# Patient Record
Sex: FEMALE | Race: WHITE | NOT HISPANIC OR LATINO | Employment: STUDENT | URBAN - METROPOLITAN AREA
[De-identification: names, ages, dates, MRNs, and addresses within clinical notes are randomized per-mention and may not be internally consistent; named-entity substitution may affect disease eponyms.]

---
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
## 2018-06-13 ENCOUNTER — RECORDS - HEALTHEAST (OUTPATIENT)
Dept: LAB | Facility: CLINIC | Age: 18
End: 2018-06-13

## 2018-06-14 LAB — C TRACH DNA SPEC QL PROBE+SIG AMP: NEGATIVE

## 2018-10-18 ENCOUNTER — TELEPHONE (OUTPATIENT)
Dept: BEHAVIORAL HEALTH | Facility: CLINIC | Age: 18
End: 2018-10-18

## 2018-10-18 ENCOUNTER — HOSPITAL ENCOUNTER (OUTPATIENT)
Dept: BEHAVIORAL HEALTH | Facility: CLINIC | Age: 18
Discharge: HOME | End: 2018-10-18
Attending: SOCIAL WORKER | Admitting: SOCIAL WORKER
Payer: COMMERCIAL

## 2018-10-18 VITALS
HEART RATE: 98 BPM | WEIGHT: 115 LBS | BODY MASS INDEX: 19.63 KG/M2 | SYSTOLIC BLOOD PRESSURE: 109 MMHG | HEIGHT: 64 IN | DIASTOLIC BLOOD PRESSURE: 88 MMHG

## 2018-10-18 PROCEDURE — H0001 ALCOHOL AND/OR DRUG ASSESS: HCPCS

## 2018-10-18 ASSESSMENT — ANXIETY QUESTIONNAIRES
4. TROUBLE RELAXING: NEARLY EVERY DAY
2. NOT BEING ABLE TO STOP OR CONTROL WORRYING: NEARLY EVERY DAY
1. FEELING NERVOUS, ANXIOUS, OR ON EDGE: NEARLY EVERY DAY
6. BECOMING EASILY ANNOYED OR IRRITABLE: NEARLY EVERY DAY
7. FEELING AFRAID AS IF SOMETHING AWFUL MIGHT HAPPEN: SEVERAL DAYS
GAD7 TOTAL SCORE: 19
5. BEING SO RESTLESS THAT IT IS HARD TO SIT STILL: NEARLY EVERY DAY
3. WORRYING TOO MUCH ABOUT DIFFERENT THINGS: NEARLY EVERY DAY

## 2018-10-18 ASSESSMENT — PAIN SCALES - GENERAL: PAINLEVEL: MODERATE PAIN (4)

## 2018-10-18 NOTE — PROGRESS NOTES
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 skills. Patient is at a moderate to high risk for relapse/continued use. She has untreated co-occurring mental health and substance use issues, which places her at higher risk for continued use.        DIMENSION VI - Recovery Environment   1. Are you employed/attending school? Tell me about that.     Patient was attending [employer], but she is taking the rest of the semester off. She works at [employer] 2 times per week. Patient reported that use has negatively impacted her work and school because she doesn't go to work or school. She has lost jobs due to drug use. Hobbies: horse, dogs, read, arts, crafts, hang with sister.         2A. Describe a typical day; evening for you. Work, school, social, leisure, volunteer, spiritual practices. Include time spent obtaining, using, recovering from drugs or alcohol. (DSM)     - Using: Wake up 2pm to 5pm, get ready, leave for friends to get stuff, use for the night, 5am bed time.  Patient reported that use has prevented her from completing daily tasks.  - Not Using - has hard time sleeping so goes to bed at 3am, now wakes up at Noon or 2pm, no schedule.     2B. How often do you spend more time than you planned using or use more than you planned? (DSM)     She didn't know when to stop many times.     3. How  important is using to your social connections? Do many of your family or friends use?     Most friends use. She will have to get new friends who don't use.     4A. Are you currently in a significant relationship?     Ended her relationship in 01/2018, but they kept talking and using together until 03/2018.     4C. Sexual Orientation:     Heterosexual    5A. Who do you live with?      She lives with her mom and sister.     5B. Tell me about their alcohol/drug use and mental health issues.     Her mother drinks most days. Her sister has been sober for about a year. Her sister emotionally stable now that she is taking medications and is sober. Her mother not emotionally stable.     5C. Are you concerned for your safety there? No    5D. Are you concerned about the safety of anyone else who lives with you? No    6A. Do you have children who live with you?     No    6B. Do you have children who do not live with you?     No    7A. Who supports you in making changes in your alcohol or drug use? What are they willing to do to support you? Who is upset or angry about you making changes in your alcohol or drug use? How big a problem is this for you?      Family.     7B. This table is provided to record information about the person s relationships and available support It is not necessary to ask each item; only to get a comprehensive picture of their support system.  How often can you count on the following people when you need someone?   Partner / Spouse N/A   Parent(s)/Aunt(s)/Uncle(s)/Grandparents Usually supportive   Sibling(s)/Cousin(s) Always supportive   Child(haim) N/A   Other relative(s) N/A   Friend(s)/neighbor(s) Usually supportive   Child(haim) s father(s)/mother(s) N/A   Support group member(s) N/A   Community of jennifer members Usually supportive   /counselor/therapist/healer N/A   Other (specify) NA     8A. What is your current living situation?     She lives with her mom and sister.     8B. What is your  long term plan for where you will be living?     She wants to move out but can't financially afford it    8C. Tell me about your living environment/neighborhood? Ask enough follow up questions to determine safety, criminal activity, availability of alcohol and drugs, supportive or antagonistic to the person making changes.      Patient reported her neighborhood is safe. She reported it's easy to get drugs because she has to drive to get them.    9. Criminal justice history: Gather current/recent history and any significant history related to substance use--Arrests? Convictions? Circumstances? Alcohol or drug involvement? Sentences? Still on probation or parole? Expectations of the court? Current court order? Any sex offenses - lifetime? What level? (DSM)    - Broke into a school - in 7th or 8th grade.   - Patient denied current legal involvement.    10. What obstacles exist to participating in treatment? (Time off work, childcare, funding, transportation, pending retirement time, living situation)     NA    Dimension VI Ratings   Recovery environment - The placing authority must use the criteria in Dimension VI to determine a client s recovery environment.   RISK DESCRIPTIONS - Severity rating: 3 Client is not engaged in structured, meaningful activity and the client s peers, family, significant other, and living environment are unsupportive, or there is significant criminal justice system involvement.    REASONS SEVERITY WAS ASSIGNED - (What support does the person have for making changes? What structure/stability does the person have in his or her daily life that will increase the likelihood that changes can be sustained? What problems exist in the person s environment that will jeopardize getting/staying clean and sober?)     Patient lives with her mother and sister. Her current living situation is somewhat supportive toward recovery, but patient's mother drinks daily. Patient denied being in a significant  relationship. She reported that most of her use of marijuana has been done alone, and her use of other drugs was done socially. Patient lacks a current sober support network. She denied having any concerns regarding immediate living environment or neighborhood. Patient is employed part-time, but hasn't worked much. She recently stopped school. She lacks a daily structure and meaningful activities. Patient denied legal involvement.      Client Choice/Exceptions   Would you like services specific to language, age, gender, culture, Adventism preference, race, ethnicity, sexual orientation or disability?  No    What particular treatment choices and options would you like to have? She wants outpatient, but is willing to do inpatient if recommended.     Do you have a preference for a particular treatment program? [facility].     Criteria for Diagnosis     Criteria for Diagnosis  DSM-5 Criteria for Substance Use Disorder  Instructions: Determine whether the client currently meets the criteria for Substance Use Disorder using the diagnostic criteria in the DSM-V pp.481-58. 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  If court related records were reviewed, summarize here: NA    Rule 25 Assessment Summary and Plan   's  Recommendation    It is recommended that patient:  1). Participate in and complete the [facility] co-occurring intensive outpatient substance use treatment in [place]. It was discussed that if patient is unable to maintain abstinence, or outpatient treatment does not meet her therapeutic needs, then it is recommended she start a higher level of care.   2). Follow all subsequent recommendations of the substance use treatment providers.   3). Abstain from alcohol and all mood-altering substances, except as prescribed. Take all medications as prescribed.   4). Attend AA or NA at least twice weekly and obtain a female sponsor for additional sober supports. Consider attending Al-Anon to address effects of alcohol from family of origin.  5). Become involved in a daily sober recreational activity/hobby of her own interest.  6). Have a mental health evaluation to determine accurate diagnoses and which psychotropic medications would be effective.   7). Begin weekly individual mental health therapy. Work with primary counselor to address suicidal ideation and complete a Patient Safety Plan.     Collateral Contacts     Name:  Jacqui Olmstead Relationship:  Mother Phone Number:  [phone] Releases:  Yes     On 10/18/2018, Therapist called and left voicemail for Ms. Olmstead.     Collateral Contacts     Name:  Latasha Olmstead Relationship:  Sister Phone Number:  [phone] Releases:  Yes     On 10/18/2018, Therapist spoke with Ms. Olmstead. She reported patient used alcohol, marijuana, and Xanax heavily and probably daily for the past two months. Patient had told Ms. Olmstead that she can't stop. It was scary to see patient go to the hospital and Ms. Olmstead hopes that is the last time patient used. Ms. Olmstead thinks patient needs at least Intensive Outpatient Treatment. Ms. Olmstead took and plans to continue to take patient to AA with her.     Collateral Contacts     Name:  [facility] Relationship:     Phone Number:    "Releases:           08/08/2017 - [facility] - Jacqueline Olmstead is a 16 year old female with a history of depression, and polysubstance abuse who presents for concern for withdrawal. Patient admits to multiple substance use including Xanax, Percocet, MDM A, cocaine, and pockets.  She tells me last night she uses Xanax and Percocet and MDMA.  She has used cocaine in the last 24 hours.  She arrives reporting stating \"she feels like shift\".  She tells me she has been to treatment in the past last time was in 2015 at [facility] she was treated for depression and anxiety.  She is supposed to be on escitalopram but does not take this routinely. She reports feeling like \"shit\" but when asked what specific symptoms she has she does not state any symptoms.    11/20/2013 - H&P - PT admitted to  with chief complaint of: \"I don't know, I've been really stressed out.\"  History is obtained from the patient and electronic health record  This patient is a 13 year old  female with a significant past psychiatric history of  depression and anxiety admitted to the hospital on 11/19/13 for the treatment of worsenign symptoms of SIB and social anxiety, now with SI as well. Also describes distorted body image and maladaptive eating behaviors (restricting, excess exercise). She states she is currently engaged in many conflicts both within her family, school, and friends. Family conflict is that she has minimal positive contact with her current family members and feels she gets into many fights with her sisters. At school she notes significant social anxiety and feels limits her ability to pursue social opportunities, which is worsened by the fact that she recently returned to [employer] school. She describes depressive symptoms including difficulty with sleep onset and night time awakening, anhedonia, decreased energy, poor concentration (although this is difficult to separate from her " "persistent anxiety), and suicidal ideation. Her suicide plan at present is to overdose, but this is not progressed to the point of obtaining a supplies for this. She also has a history of self injurious behavior. This began approximately one year ago, stopped after about four months, and resumed two months ago with the start of the new school year. In terms of treatments, she has received about two to three months of therapy, and has been taking citalopram for approximately 4 weeks. She is uncertain as to whether citalopram has had a positive effect at present.     With regard to anxiety, she states her primary anxieties are in social situations in which she worries a great deal about what people think of her, and this leads her to decrease her pursuit of age-appropriate social activities. She states that when in social situations she will sometimes experience \"anxiety attacks\", which consists of her feeling dizzy, racing heart, and on several occasions feels nauseous and has dry heaves.      She also describes a significantly distorted body image. She feels she is quite overweight. Although she does not describe an ideal body weight, she states she tries to eat around 500 mariano per day. She also states that she exercises as much two hours per day, including significant amount time spent running on a treadmill.    Criteria for Diagnosis     A problematic pattern of alcohol/drug use leading to clinically significant impairment or distress, as manifested by at least two of the following, occurring within a 12-month period: 11/11    Alcohol/drug is often taken in larger amounts or over a longer period than was intended.  There is a persistent desire or unsuccessful efforts to cut down or control alcohol/drug use  A great deal of time is spent in activities necessary to obtain alcohol, use alcohol, or recover from its effects.  Craving, or a strong desire or urge to use alcohol/drug  Recurrent alcohol/drug use resulting " in a failure to fulfill major role obligations at work, school or home.  Continued alcohol use despite having persistent or recurrent social or interpersonal problems caused or exacerbated by the effects of alcohol/drug.  Important social, occupational, or recreational activities are given up or reduced because of alcohol/drug use.  Recurrent alcohol/drug use in situations in which it is physically hazardous.  Alcohol/drug use is continued despite knowledge of having a persistent or recurrent physical or psychological problem that is likely to have been caused or exacerbated by alcohol.  Tolerance, as defined by either of the following: A need for markedly increased amounts of alcohol/drug to achieve intoxication or desired effect.  Withdrawal, as manifested by either of the following: The characteristic withdrawal syndrome for alcohol/drug (refer to Criteria A and B of the criteria set for alcohol/drug withdrawal).    Specify if: In early remission:  After full criteria for alcohol/drug use disorder were previously met, none of the criteria for alcohol/drug use disorder have been met for at least 3 months but for less than 12 months (with the exception that Criterion A4,  Craving or a strong desire or urge to use alcohol/drug  may be met).     In sustained remission:   After full criteria for alcohol use disorder were previously met, none of the criteria for alcohol/drug use disorder have been met at any time during a period of 12 months or longer (with the exception that Criterion A4,  Craving or strong desire or urge to use alcohol/drug  may be met).   Specify if:   This additional specifier is used if the individual is in an environment where access to alcohol is restricted.    Mild: Presence of 2-3 symptoms  Moderate: Presence of 4-5 symptoms  Severe: Presence of 6 or more symptoms
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
## 2018-10-18 NOTE — TELEPHONE ENCOUNTER
"SBAR  Name:   Jacqueline Olmstead YOB: 2000 Age:  17 year old Gender:  female   Insurance:   [facility]   Precipitating Event:   Treatment due to own awareness of need for help and Treatment due to pressure from family members to get help   DOC:   Alcohol, Marijuana and Benzodiazepines   Additional abused substances:   Cocaine/Crack, Meth/Amphetamines and Opiates   Medical:   No chronic health problems   Mental Health:   Depression and Anxiety   Previous Detox admissions & CD treatments:  No prior IP detoxification admission(s).  2 prior CD treatment(s).   Psychosocial:  Single, in no serious relationship  No children  Unhealthy and/or using living environment  Minimal support network, Tendency to isolate from others, Relationship conflict with family members or friends due to substance abuse, No history of legal charges, At risk of losing job and Decreased performance at work or school in part due to substance abuse   [place] Suicide Risk Status:    Past month:3. - Moderate Risk: Document in [employer] to counselor, Collaborate with patient / client to develop \"Patient Safety Plan\", Address in Treatment Plan, Continuous monitoring, assessment and intervention and Address in Discharge / Transition Plan  Past 24 hours:1. - Low Risk: Evaluation Counselors:  Document in Epic / SBAR to counselor \"Low Risk\".      Treatment Counselors:  Reassess upon admission as applicable, assess weekly in progress notes under Dimension 3 and summarize in Discharge / Treatment summary under Dimension 3.     Additional Info as needed: **Per Medical Record: Patient endorsed suicidal ideation when she was hospitalized at [facility] on 10/13/2018 when she took Xanax and alcohol. She reports that the Xanax helps her with the anxiety and she denies that she was attempting to harm herself. She has stated, however, that she \"does not care if I die.\"      "

## 2018-10-18 NOTE — PROGRESS NOTES
"ASSESSMENT SUMMARY    PATIENT NAME: Jacqueline Olmstead  MEDICAL RECORD NUMBER: [id number]  PATIENT ADDRESS: [address] [place] [id number]  HOME TELEPHONE NUMBER: [phone] (home)   STATISTICS: YOB: 2000     Age: 17 year old     Gender: female    RELATIONSHIP STATUS:  Single, in no serious relationship    DATE OF ASSESSMENT: 10/18/2018  EVALUATION COUNSELOR: Alicia Caldwell    REFERRAL SOURCE: Self    REASON FOR EVALUATION:     On 10/18/2018, Ms. Olmstead presented to the office of [facility] for a Rule 25 evaluation of substance use. She reported she has recently had trouble staying sober. She doesn't want to live life like that any more. **Per medical record from [facility] on 10/14/2018: Brief history: Jacqueline Olmstead is a 17 y.o. female, history of bipolar, depression, anxiety and substance abuse, who presents with mother and sister for evaluation of ingestion. Patient reports she is not happy with how her life (school, family and work) is going and that she is stressed. She states that she has been abusing xanax for the past couple of months. She reports that she uses xanax bars 2-5 times daily and each bar is 2g. Today she took ~6 bars of xanax and admits to drinking. She reports that the xanax helps her with the anxiety and she denies that she was attempting to harm herself. She has stated, however, that she \"does not care if I die.\" Per patient's family, patient was at a party and called her mother to pick her up as she did not feel safe. Patient was barely able to speak on the phone secondary to alcohol and xanax ingestion. Her mother states that she did not know that the patient was on xanax until a few days ago.    HEALTH HISTORY AND MEDICATIONS:     Patient denied having any chronic biomedical conditions that would interfere with treatment. Patient is prescribed birth control and acne medication. She doesn't take her depression and anxiety medications. " "She reported having pain in her stomach, with a pain level of a 4 from 0-10. Patient has a primary care clinic and is able to seek services as needed and she would benefit from following all of the recommendations of medical providers.    HISTORY OF PREVIOUS TREATMENT AND COUNSELIN and  - She did adolescent inpatient and outpatient substance use treatment at [facility].     HISTORY OF ALCOHOL AND DRUG USE:     - Alcohol - Since end of 2018 to Current - drinking daily or every other day, consuming beer, vodka, or whiskey \"to the point where I get drunk.\" LAST USE: 10/13/2018 - she was hospitalized for using alcohol and Xanax   - Marijuana - Since 9th grade - almost daily use. LAST USE: 10/14/2018. Dabs - Since age 15 - almost daily smoking 1/2g. LAST USE:2 weeks ago, 1/2g  - Xanax -  Since end of 2018 until 10/13/18 - daily used 1/2bar to 7 or 8 full bars (each bar is 2mg).  She gets Xanax from others. She has tried other benzos, but prefers Xanax.  LAST USE: 10/13/18, 6 bars  - Cocaine - Pattern of Use - snorting 1 bump up to 1g about two to three times per month. LAST USE: 10/06/18, 1/4g  - Adderall - 9th grade - used daily for a week. Current Use - once a month, normally taking 1 to 5 pills. LAST USE: 2018, 1 pill  - Oxy, Percocet, Vicodin - Age 14 until Current - takes various pills two to three times per month. LAST USE: End of 2018    SUMMARY OF SUBSTANCE USE DISORDER SYMPTOMS ACKNOWLEDGED BY THE PATIENT: The patient identified positively with 11 of the 11 DSM-5 criteria for a primary diagnostic impression of substance use disorder severe.     SUMMARY OF COLLATERAL DATA:    - On 10/18/2018, Therapist spoke with Ms. Olmstead. She reported patient used alcohol, marijuana, and Xanax heavily and probably daily for the past two months. Patient had told Ms. Olmstead that she can't stop. It was scary to see patient go to the hospital and Ms. Olmstead hopes that is the last time patient used. MsSerafin Olmstead " "thinks patient needs at least Intensive Outpatient Treatment. Ms. Olmstead took and plans to continue to take patient to  with her.   - 08/08/2017 - [facility] Emergency Department - Jacqueline Olmstead is a 16 year old female with a history of depression, and polysubstance abuse who presents for concern for withdrawal. Patient admits to multiple substance use including Xanax, Percocet, MDM A, cocaine, and pockets.  She tells me last night she uses Xanax and Percocet and MDMA.  She has used cocaine in the last 24 hours.  She arrives reporting stating \"she feels like shift\".  She tells me she has been to treatment in the past last time was in 2015 at [facility] she was treated for depression and anxiety.  She is supposed to be on escitalopram but does not take this routinely. She reports feeling like \"shit\" but when asked what specific symptoms she has she does not state any symptoms.      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    Patient does not appear at risk of having significant withdrawal symptoms at this time. She denied current feelings of withdrawal. Patient reports that her last use of alcohol and Xanax was on 10/13/2018 and her last use of marijuana was 10/14/2018. She was hospitalized on 10/13/2018 and medically monitored. Patient was administered a breathalyzer during the evaluation and the SANKET was 0.00. Patient was also administered an urinalysis during the evaluation and the UA was positive for THC and " benzodiazepine. If she resumes drinking and using, patient may need detox before treatment. At the time of the Substance Use Evaluation her blood pressure was 109/88 and pulse was 98 BPM.     DIMENSION 2: Biomedical Conditions:  The patient scored a 1.    Patient denied having any chronic biomedical conditions that would interfere with treatment. Patient is prescribed birth control and acne medication. She doesn't take her depression and anxiety medications. She reported having pain in her stomach, with a pain level of a 4 from 0-10. Patient has a primary care clinic and is able to seek services as needed and she would benefit from following all of the recommendations of medical providers.    DIMENSION 3: Emotional and Behavioral:  The patient scored a 2.    Patient reported diagnoses of depression and social anxiety. Patient s PHQ-9 score was 23 out of 27, indicating severe depression. Patient s SUMANTH-7 score was 19 out of 21, indicating severe anxiety. Patient denied suicidal and self-injurious ideation and intent at this time. Patient reported one suicide attempt in the past. Patient was hospitalized last week for using alcohol and Xanax and had suicidal ideation. Patient reported a history of verbal and emotional abuse. She would benefit from beginning individual mental health therapy to address depression and social anxiety.     DIMENSION 4: Readiness to Change:  The patient scored a 2.    Patient identified marijuana and benzo use as problematic. She would like to keep smoking marijuana, but is willing to stop and sees benefit in that. She denied alcohol was a problem, even though she has been drinking daily and blacking out. Patient appears to be unaware of how mental health symptoms of depression and anxiety factor into drug use.      DIMENSION 5: Relapse Potential:  The patient scored a 3.    Patient reported two past treatments and recent support group attendance. She reported having minimal sober time. She  has tried to quit using and drinking in the past but returned to use. Patient lacks knowledge of the addiction cycle. She lacks insight into her personal relapse process along with warning signs and triggers. Patient lacks insight into the effects substance use has had on her physical and mental health. She lacks impulse control, sober coping skills, and long-term sober maintenance skills. Patient is at a moderate to high risk for relapse/continued use. She has untreated co-occurring mental health and substance use issues, which places her at higher risk for continued use.     DIMENSION 6: Recovery Environment:  The patient scored a 3.    Patient lives with her mother and sister. Her current living situation is somewhat supportive toward recovery, but patient's mother drinks daily. Patient denied being in a significant relationship. She reported that most of her use of marijuana has been done alone, and her use of other drugs was done socially. Patient lacks a current sober support network. She denied having any concerns regarding immediate living environment or neighborhood. Patient is employed part-time, but hasn't worked much. She recently stopped school. She lacks a daily structure and meaningful activities. Patient denied legal involvement.     RECOMMENDATIONS:    It is recommended that patient:  1). Participate in and complete the [facility] intensive outpatient substance use treatment in [place]. It was discussed that if patient is unable to maintain abstinence, or outpatient treatment does not meet her therapeutic needs, then it is recommended she start a higher level of care.   2). Follow all subsequent recommendations of the substance use treatment providers.   3). Abstain from alcohol and all mood-altering substances, except as prescribed. Take all medications as prescribed.   4). Attend AA or NA at least twice weekly and obtain a female sponsor for additional sober supports. Consider  attending [place] to address effects of alcohol from family of origin.  5). Become involved in a daily sober recreational activity/hobby of her own interest.  6). Have a mental health evaluation to determine accurate diagnoses and which psychotropic medications would be effective.   7). Begin weekly individual mental health therapy. Work with primary counselor to address suicidal ideation and complete a Patient Safety Plan.     INITIAL PROBLEM LIST:    The patient is currently living in an unhealthy and/or using environment  The patient lacks relapse prevention skills  The patient has poor coping skills  The patient has poor refusal skills   The patient lacks a sober peer support network  The patient lacks the ability to effectively manage his/her mental health issues  The patient has a significant history of trauma and/or abuse issues      This information has been disclosed to you from records protected by Federal confidentiality rules (42 CFR part 2). The Federal rules prohibit you from making any further disclosure of this information unless further disclosure is expressly permitted by the written consent of the person to whom it pertains or as otherwise permitted by 42 CFR part 2. A general authorization for the release of medical or other information is NOT sufficient for this purpose. The Federal rules restrict any use of the information to criminally investigate or prosecute any alcohol or drug abuse patient.

## 2018-10-19 ENCOUNTER — RECORDS - HEALTHEAST (OUTPATIENT)
Dept: LAB | Facility: CLINIC | Age: 18
End: 2018-10-19

## 2018-10-19 LAB — HIV 1+2 AB+HIV1 P24 AG SERPL QL IA: NEGATIVE

## 2018-10-19 ASSESSMENT — ANXIETY QUESTIONNAIRES: GAD7 TOTAL SCORE: 19

## 2018-10-19 ASSESSMENT — PATIENT HEALTH QUESTIONNAIRE - PHQ9: SUM OF ALL RESPONSES TO PHQ QUESTIONS 1-9: 23

## 2018-10-20 LAB
HCV AB SERPL QL IA: NEGATIVE
T PALLIDUM AB SER QL: NEGATIVE

## 2018-10-22 LAB
C TRACH DNA SPEC QL PROBE+SIG AMP: NEGATIVE
N GONORRHOEA DNA SPEC QL NAA+PROBE: NEGATIVE

## 2018-10-22 NOTE — TELEPHONE ENCOUNTER
Client left a message over the weekend about wanting to start treatment. I returned her call at this time however was only able to leave a message requesting a call back.

## 2018-10-30 NOTE — TELEPHONE ENCOUNTER
Client left a message stating she wanted to get started with group. I returned her call however was only able to leave a message requesting a call back. I provided my direct contact information for a return call.

## 2018-11-12 ENCOUNTER — HOSPITAL ENCOUNTER (OUTPATIENT)
Dept: BEHAVIORAL HEALTH | Facility: CLINIC | Age: 18
End: 2018-11-12
Attending: SOCIAL WORKER | Admitting: FAMILY MEDICINE
Payer: COMMERCIAL

## 2018-11-12 ENCOUNTER — TELEPHONE (OUTPATIENT)
Dept: BEHAVIORAL HEALTH | Facility: CLINIC | Age: 18
End: 2018-11-12

## 2018-11-12 DIAGNOSIS — F19.90 SUBSTANCE USE DISORDER: ICD-10-CM

## 2018-11-12 DIAGNOSIS — F19.20 CHEMICAL DEPENDENCY (H): ICD-10-CM

## 2018-11-12 PROCEDURE — H2035 A/D TX PROGRAM, PER HOUR: HCPCS

## 2018-11-12 NOTE — PROGRESS NOTES
Initial Services Plan        Before your first treatment group, please do the following    Immediate health & safety concerns: N/A    Suggestions for client during the time between intake & completion of treatment plan:  Complete the problem list for your treatment plan.  Review your patient or client handbook.    Client issues to be addressed in the first treatment sessions:  Identify motivations(s) for coming to treatment, i.e. legal, family, job, self      Karen Brandt MA, [facility], [facility]  11/12/2018  12:04 PM

## 2018-11-12 NOTE — TELEPHONE ENCOUNTER
----- [facility] sent at 11/12/2018  7:56 AM CST -----  Please schedule an individual appointment for this client 11/12/20018 at 12 pm. Thank you.

## 2018-11-12 NOTE — PROGRESS NOTES
Patient Safety Plan Template    Name:   Jacqueline Olmstead YOB: 2000 Age:  18 year old MR Number:  [id number]   Step 1: Warning signs (Thoughts, images, mood, situation, behavior) that a crisis may be developin. Being away from home all the time.      2. Sleeping all the time.      3. Isolating from others.     Step 2: Internal coping strategies - Things I can do to take my mind off of my problems without contacting another person (relaxation technique, physical activity):     1. Listening to music   2. Playing with my dogs   3. Visiting my hours   Step 3: People and social settings that provide distraction:     1. Name: Jacqui   Phone: [phone]   2. Name: [place]   Phone: [phone]   3. Place: work   4. Place: Grandparents home     The one thing that is most important to me and worth living for is: My animals     Step 4: People whom I can ask for help:     1. Name: Jacqui   Phone:[phone]     2. Name:[place]   Phone: [phone]     3. Name: Hossein   Phone: [phone]     Step 5: Professionals or agencies I can contact during a crisis:     1. Clinician Name: Karen Brandt MA, [facility], [facility]   Phone: [phone]   Clinician Pager or Emergency Contact #: [phone]     2. Clinician Name: NAVEED   Phone: NAVEED     Clinician Pager or Emergency Contact #: NA     3. Local Urgent Care Services: [facility]    Urgent Care Services Address: [address] [place] [address]    Urgent Care Services Phone: [phone]     4. Suicide Prevention Lifeline Phone: [phone] ([phone])     Step 6: Making the environment safe:     1. Maintain chemical free living environment     2. Disconnect from all using people and using contacts.     Safety Plan Template 2008 Leni Silva and Rajiv Jenkins is reprinted with the express permission of the authors.  No portion of the Safety Plan Template may be reproduced without the express, written permission.  You can contact the authors at bhs@[email or web address] or  alva@[email or web address].

## 2018-11-12 NOTE — PROGRESS NOTES
Patient:  Jacqueline Olmstead    Date: November 12, 2018    Comprehensive Assessment UPDATE        Comprehensive Summary Update and Review  Counselor met with patient on 11/12/2018 and reviewed the Comprehensive Assessment.    The following updates have been made: Last use date changed from 10/14/2018 to 11/11/2018. Client also reports she started new/different medications. She states she started taking; Lamotrigine 25mg twice daily.     Gray-Suicide Severity Rating Scale Reassessment   Have you ever wished you were dead or that you could go to sleep and not wake up?  Past Month:  Yes   Have you actually had any thoughts of killing yourself?  Past Month:  No   Have you been thinking about how you might do this?     Past Month:  No Lifetime:  Yes, Describe: Client states in 9th grade she had a suicidal plan and was hospitalized. She reports in 10th grade she attempted suicide by taking a bunch of Xanax. She states she has continued to have passive suicidal ideation by stating she does not care if she dies.   Have you had these thoughts and had some intention of acting on them?     Past Month:  No Lifetime:  Yes, Describe: see above.   Have you started to work out the details of how to kill yourself?   Past Month:  No Lifetime:  Yes, Describe: see above.   Do you intend to carry out this plan?   No   When you have the thoughts how long do they last?  1-4 hours/a lot of time   Are there things - anyone or anything (i.e. family, Mu-ism, pain of death) that stopped you from wanting to die or acting on thoughts of suicide?  Protective factors probably stopped you     2008  [employer]  Used with permission by Radha Mares, PhD.       Guide to C-SSRS Risk Ratings   NO IDEATION:  with no active thoughts IDEATION: with a wish to die. IDEATION: with active thoughts. Risk Ratings   If Yes No No 0 - Very Low Risk   If NA Yes No 1 - Low Risk   If NA Yes Yes 2 - Low/moderate risk  "  IDEATION: associated thoughts of methods without intent or plan INTENT: Intent to follow through on suicide PLAN: Plan to follow through on suicide Risk Ratings cont...   If Yes No No 3 - Moderate Risk   If Yes Yes No 4 - High Risk   If Yes Yes Yes 5 - High Risk   The patient's ADDITIONAL RISK FACTORS and lack of PROTECTIVE FACTORS may increase their overall suicide risk ratings.     Additional Risk Factors:    Someone close to the patient (family member/friend) completed a suicide     Significant history of having untreated or poorly treated mental health symptoms     Tendency to be socially isolated and/or cut off from the support of others   Protective Factors:    Having people in his/her life that would prevent the patient from considering a suicide attempt (i.e. young children, spouse, parents, etc.)     Having pet(s) that give companionship and/or would prevent the patient from considering a suicide attempt     Having easy access to supportive family members     Having restricted access to highly lethal means of suicide     Risk Status   1. - Low Risk: Evaluation Counselors:  Document in Epic / SBAR to counselor \"Low Risk\".      Treatment Counselors:  Reassess upon admission as applicable, assess weekly in progress notes under Dimension 3 and summarize in Discharge / Treatment summary under Dimension 3.   Additional information to support suicide risk rating: There was no additional information to provide at this time.  Please see the above suicide risk rating information.     "

## 2018-11-12 NOTE — PROGRESS NOTES
Comprehensive Assessment Summary     Based on client interview, review of previous assessments and   comprehensive assessment interview the following diagnosis and recommendations are:     Patient: Jacqueline Olmstead  MRN; [id number]  : 2000  Age: 18 year old Sex: female      Client meets criteria for:  303.90 Alcohol Dependence  304.30 Cannabis Dependence  304.10 Sedative/Hypnotic Dependence  304.40 Amphetamine Dependence  304.00 Opioids Dependence  305.10 Nicotine Dependence    Dimension One: Acute Intoxication/Withdrawal Potential     Ratin  (Consider the client's ability to cope with withdrawal symptoms and current state of intoxication)       Client reports her last use date was 2018.    Dimension Two: Biomedical Condition and Complications    Ratin  (Consider the degree to which any physical disorder would interfere with treatment for substance abuse, and the client's ability to tolerate any related discomfort; determine the impact of continued chemical use on the unborn child if the client is pregnant)       Client denies any medical issues that need direct care or intervention at this time.     Dimension Three: Emotional/Behavioral/Cognitive Conditions & Complications  Ratin  (Determine the degree to which any condition or complications are likely to interfere with treatment for substance abuse or with functioning in significant life areas and the likelihood of risk of harm to self or others)       Client presents with a diagnosis of MDD, Social Anxiety and Bipolar disorder. Client states she is prescribed Lamotrigine however reports she is not good at remembering to take it and has been inconsistent. Client sttaes she is in the process of scheduling individual therapy and psychiatry and has agreed to sign RAE's when she solidifies therapists. Client's protective factors include; supportive, sober sister. Client's potential risk factors include; use in the home, lack of peer  "sober network, anger, and lack of impulse control. Client reports recent passive suicidal thoughts however denies any recent attempt or plan. Client reports a history of cutting behaviors in 9th grade and reports in 8th and 10th grade she engaged in eating disordered behaviors however denies anything of these behaviors since that time. Client denies any suicidal thought, plan or intent this week. Client also denies any thoughts or behaviors of self harm this week.     Dimension Four: Treatment Acceptance/Resistance     Ratin  (Consider the amount of support and encouragement necessary to keep the client involved in treatment)       Client rates their motivation at an 8 on a scale of 1 - 10 with 10 being the highest. Client reports their main motivation for recovery is \"I don't want to live life this way anymore; I'm not happy\". Client reports that currently lack of peer sober support is blocking their motivation for sobriety. Client talked about wanting to work on; mental health, dealing with her emotions and anger during her stay in treatment. Client had active, direct involvement in selecting the anticipated outcomes of the treatment process and developing the treatment plan. It also appears as though client would benefit from addressing; cross addiction, defenses, and further understanding of relapse prevention.     Dimension Five: Continued Use/Relapse Prevention     Ratin  (Consider the degree to which the client's recognizes relapse issues and has the skills to prevent relapse of either substance use or mental health problems)       Client reports their cravings at a 10 on a scale of 1 - 10 with 10 being the highest. Client reports \"listening to music, playing with my dogs and visiting my horse\" help minimize their cravings. Client reports their biggest trigger this week was being around others who are using and fighting with my family. She talks about how they argue about a lot of \"stupid\" things. " Client presents with minimal impulse control and lacks insight in to the severity of her disease.     Dimension Six: Recovery Environment     Rating:   3  (Consider the degree to which key areas of the client's life are supportive of or antagonistic to treatment participation and recovery)       Client reports attending no meetings this week. Client does not have a sponsor. Client states that meetings have been triggering for her despite her verbalized understanding of how they could be helpful. She agreed she would talk during her first group about what meetings may be more supportive for her. Client is currently employed part time working about 2 days a week. Client talked about needing to miss some group up front due to pre-arranged work schedule. She understands this will be acceptable in the beginning however moving forward would not be allowed. Client denies any legal issues.  Client is currently living in an environment where there is active chemical use. She also reports no sober peer network.     I have reviewed the information on the assessment, psychosocial and medical history and checklist:        it is current

## 2018-11-13 NOTE — TELEPHONE ENCOUNTER
----- Message from VALENTINA Benitez sent at 11/13/2018  7:25 AM CST -----  Please schedule 30 sessions of [id number]. I have faxed in insurance authorization and expect to hear back today.

## 2018-11-14 NOTE — TELEPHONE ENCOUNTER
Client left a message with the  yesterday and it was forwarded to me after I had gone home for the day. She just left her name and number and did not leave any specific question. I returned client's call this morning and explained I had not heard back from her insurance company yet on authorization. I stated if she needed anything further or had a different question to give me a call back. I confirmed I would call her as soon as I heard back from insurance. I provided my direct contact information.

## 2018-11-14 NOTE — TELEPHONE ENCOUNTER
I left a message for client confirming I received authorization from her insurance company. I explained I would expect to see her on Monday at 9 am for group and at that time we would schedule her DA. I provided my direct contact information for a return call at their convenience.

## 2018-11-19 NOTE — TELEPHONE ENCOUNTER
----- Message from VALENTINA Benitez sent at 11/19/2018  7:33 AM CST -----  Please schedule an individual appointment on 11/20/2018 at 8:00am. Thank you.

## 2018-11-21 NOTE — TELEPHONE ENCOUNTER
Client left a message last night at 6pm stating she was sick and that is why she was not in group. She asked for a call back to discuss when she can start next week. I returned her call this morning however was only able to leave a message as her phone went right to voicemail. I provided her with a start date of Wednesday November 28th at 8am in the morning. I explained she needs to be here and prompt on this day or she would not be allowed in and we would look at discharging her to a higher level of care. I explained that its important she has these assessments in a timely manner and reminded her that we have already talked about this in her service initiation. I outlined we had talked about the attendance policy, the timelines for the DA and that in her assessment she was recommended to a higher level of care and her behaviors currently are not indicating that this will be enough structure for her.

## 2019-03-08 ENCOUNTER — RECORDS - HEALTHEAST (OUTPATIENT)
Dept: LAB | Facility: CLINIC | Age: 19
End: 2019-03-08

## 2019-03-08 LAB — HIV 1+2 AB+HIV1 P24 AG SERPL QL IA: NEGATIVE

## 2019-03-09 LAB — T PALLIDUM AB SER QL: NEGATIVE

## 2019-03-11 LAB
C TRACH DNA SPEC QL PROBE+SIG AMP: NEGATIVE
HCV AB SERPL QL IA: NEGATIVE
N GONORRHOEA DNA SPEC QL NAA+PROBE: NEGATIVE

## 2019-05-08 ENCOUNTER — APPOINTMENT (OUTPATIENT)
Age: 19
Setting detail: DERMATOLOGY
End: 2019-05-08

## 2019-05-08 VITALS — HEIGHT: 64 IN | WEIGHT: 115 LBS

## 2019-05-08 DIAGNOSIS — L70.0 ACNE VULGARIS: ICD-10-CM

## 2019-05-08 DIAGNOSIS — L20.89 OTHER ATOPIC DERMATITIS: ICD-10-CM

## 2019-05-08 PROCEDURE — 81025 URINE PREGNANCY TEST: CPT

## 2019-05-08 PROCEDURE — 36415 COLL VENOUS BLD VENIPUNCTURE: CPT

## 2019-05-08 PROCEDURE — OTHER ORDER TESTS: OTHER

## 2019-05-08 PROCEDURE — OTHER ISOTRETINOIN MONITORING: OTHER

## 2019-05-08 PROCEDURE — OTHER VENIPUNCTURE: OTHER

## 2019-05-08 PROCEDURE — OTHER URINE PREGNANCY TEST: OTHER

## 2019-05-08 PROCEDURE — OTHER PRESCRIPTION: OTHER

## 2019-05-08 PROCEDURE — OTHER COUNSELING: OTHER

## 2019-05-08 PROCEDURE — 99213 OFFICE O/P EST LOW 20 MIN: CPT | Mod: 25

## 2019-05-08 RX ORDER — TRIAMCINOLONE ACETONIDE 1 MG/G
0.1% CREAM TOPICAL BID PRN
Qty: 1 | Refills: 0 | Status: ERX | COMMUNITY
Start: 2019-05-08

## 2019-05-08 RX ORDER — ISOTRETINOIN 40 MG/1
40MG CAPSULE, LIQUID FILLED ORAL
Qty: 30 | Refills: 0 | Status: ERX | COMMUNITY
Start: 2019-05-08

## 2019-05-08 ASSESSMENT — LOCATION DETAILED DESCRIPTION DERM
LOCATION DETAILED: RIGHT RADIAL DORSAL HAND
LOCATION DETAILED: LEFT ULNAR DORSAL HAND

## 2019-05-08 ASSESSMENT — LOCATION ZONE DERM: LOCATION ZONE: HAND

## 2019-05-08 ASSESSMENT — LOCATION SIMPLE DESCRIPTION DERM
LOCATION SIMPLE: LEFT HAND
LOCATION SIMPLE: RIGHT HAND

## 2019-05-08 NOTE — HPI: RASH
How Severe Is Your Rash?: mild
Is This A New Presentation, Or A Follow-Up?: Rash
Additional History: Has not responded to moisturisers.

## 2019-05-08 NOTE — HPI: MEDICATION (ISOTRETINOIN)
Is This A New Presentation, Or A Follow-Up?: Follow Up Isotretinoin
Additional History: Dryness is tolerable. Uses cetaphil cream. She reports 100% compliance with both forms of bc. Getting rashy skin on dorsal hand.

## 2019-05-08 NOTE — PROCEDURE: VENIPUNCTURE
Bill For Individual Tests Below?: no
Detail Level: None
Number Of Tubes Drawn: 1
Venipuncture Paragraph: - An alcohol pad was applied to the venipuncture site. \\n- Venipuncture was performed using a butterfly needle. \\n- Pressure and a band-aid was applied to the site. \\n- No complications were noted.

## 2019-06-14 ENCOUNTER — APPOINTMENT (OUTPATIENT)
Age: 19
Setting detail: DERMATOLOGY
End: 2019-06-14

## 2019-06-14 VITALS — HEIGHT: 64 IN | WEIGHT: 115 LBS

## 2019-06-14 DIAGNOSIS — L70.0 ACNE VULGARIS: ICD-10-CM

## 2019-06-14 PROCEDURE — OTHER URINE PREGNANCY TEST: OTHER

## 2019-06-14 PROCEDURE — OTHER ISOTRETINOIN MONITORING: OTHER

## 2019-06-14 PROCEDURE — 81025 URINE PREGNANCY TEST: CPT

## 2019-06-14 PROCEDURE — 99213 OFFICE O/P EST LOW 20 MIN: CPT

## 2019-06-14 PROCEDURE — OTHER PRESCRIPTION: OTHER

## 2019-06-14 RX ORDER — ISOTRETINOIN 30 MG/1
60MG CAPSULE, LIQUID FILLED ORAL QD
Qty: 60 | Refills: 0 | Status: ERX | COMMUNITY
Start: 2019-06-14

## 2019-06-14 NOTE — HPI: MEDICATION (ISOTRETINOIN)
Is This A New Presentation, Or A Follow-Up?: Follow Up Isotretinoin
Additional History: Patient reports 100% compliance with both forms of birth control. She would like to increase the dose today. Dryness is tolerable.

## 2019-07-18 ENCOUNTER — APPOINTMENT (OUTPATIENT)
Age: 19
Setting detail: DERMATOLOGY
End: 2019-07-18

## 2019-07-18 VITALS — WEIGHT: 114 LBS | RESPIRATION RATE: 16 BRPM | HEIGHT: 64 IN

## 2019-07-18 DIAGNOSIS — L70.0 ACNE VULGARIS: ICD-10-CM

## 2019-07-18 DIAGNOSIS — K13.0 DISEASES OF LIPS: ICD-10-CM

## 2019-07-18 PROCEDURE — OTHER ISOTRETINOIN MONITORING: OTHER

## 2019-07-18 PROCEDURE — OTHER PRESCRIPTION: OTHER

## 2019-07-18 PROCEDURE — 81025 URINE PREGNANCY TEST: CPT

## 2019-07-18 PROCEDURE — 99213 OFFICE O/P EST LOW 20 MIN: CPT

## 2019-07-18 PROCEDURE — OTHER COUNSELING: OTHER

## 2019-07-18 PROCEDURE — OTHER URINE PREGNANCY TEST: OTHER

## 2019-07-18 RX ORDER — ISOTRETINOIN 30 MG/1
60MG CAPSULE, LIQUID FILLED ORAL QD
Qty: 60 | Refills: 0 | Status: ERX

## 2019-07-18 RX ORDER — HYDROCORTISONE 2.5 %
OINTMENT (GRAM) TOPICAL BID PRN
Qty: 1 | Refills: 0 | Status: ERX | COMMUNITY
Start: 2019-07-18

## 2019-07-18 ASSESSMENT — LOCATION SIMPLE DESCRIPTION DERM
LOCATION SIMPLE: CHEST
LOCATION SIMPLE: RIGHT UPPER LIP
LOCATION SIMPLE: LEFT UPPER BACK
LOCATION SIMPLE: LEFT CHEEK
LOCATION SIMPLE: LEFT LIP

## 2019-07-18 ASSESSMENT — LOCATION DETAILED DESCRIPTION DERM
LOCATION DETAILED: LEFT MEDIAL SUPERIOR CHEST
LOCATION DETAILED: LEFT ORAL COMMISSURE
LOCATION DETAILED: LEFT SUPERIOR UPPER BACK
LOCATION DETAILED: RIGHT SUPERIOR VERMILION BORDER
LOCATION DETAILED: LEFT INFERIOR CENTRAL MALAR CHEEK

## 2019-07-18 ASSESSMENT — LOCATION ZONE DERM
LOCATION ZONE: LIP
LOCATION ZONE: FACE
LOCATION ZONE: TRUNK

## 2019-07-18 NOTE — HPI: MEDICATION (ISOTRETINOIN)
How Severe Is It?: moderate
Is This A New Presentation, Or A Follow-Up?: Follow Up Isotretinoin
Additional History: Patient reports 100% compliance with both forms of birth control. Dryness is tolerable. 3 new pimples this past month.

## 2019-07-18 NOTE — PROCEDURE: COUNSELING
Patient Specific Counseling (Will Not Stick From Patient To Patient): If not resolved after two weeks, cut those in half of the isotretinoin from 60 mg once per day to30 mg once per day
Detail Level: Detailed

## 2019-08-22 ENCOUNTER — APPOINTMENT (OUTPATIENT)
Age: 19
Setting detail: DERMATOLOGY
End: 2019-08-22

## 2019-08-22 VITALS — RESPIRATION RATE: 16 BRPM | WEIGHT: 110 LBS | HEIGHT: 64 IN

## 2019-08-22 DIAGNOSIS — L70.0 ACNE VULGARIS: ICD-10-CM

## 2019-08-22 PROCEDURE — 81025 URINE PREGNANCY TEST: CPT

## 2019-08-22 PROCEDURE — OTHER ISOTRETINOIN MONITORING: OTHER

## 2019-08-22 PROCEDURE — 99214 OFFICE O/P EST MOD 30 MIN: CPT

## 2019-08-22 PROCEDURE — OTHER URINE PREGNANCY TEST: OTHER

## 2019-08-22 PROCEDURE — OTHER PRESCRIPTION: OTHER

## 2019-08-22 RX ORDER — ISOTRETINOIN 30 MG/1
60MG CAPSULE, LIQUID FILLED ORAL QD
Qty: 60 | Refills: 0 | Status: ERX

## 2019-08-22 ASSESSMENT — LOCATION ZONE DERM
LOCATION ZONE: TRUNK
LOCATION ZONE: FACE

## 2019-08-22 ASSESSMENT — LOCATION SIMPLE DESCRIPTION DERM
LOCATION SIMPLE: CHEST
LOCATION SIMPLE: LEFT CHEEK
LOCATION SIMPLE: LEFT UPPER BACK

## 2019-08-22 ASSESSMENT — LOCATION DETAILED DESCRIPTION DERM
LOCATION DETAILED: LEFT MEDIAL SUPERIOR CHEST
LOCATION DETAILED: LEFT SUPERIOR UPPER BACK
LOCATION DETAILED: LEFT INFERIOR CENTRAL MALAR CHEEK

## 2019-08-22 NOTE — PROCEDURE: ISOTRETINOIN MONITORING
Addended by: Tali Guerrero on: 4/6/2019 03:42 PM     Modules accepted: Orders Myalgia Monitoring: I explained this is common when taking isotretinoin. If this worsens they will contact us.

## 2019-08-22 NOTE — HPI: MEDICATION (ISOTRETINOIN)
How Severe Is It?: moderate
Is This A New Presentation, Or A Follow-Up?: Follow Up Isotretinoin
Additional History: Dryness is tolerable. Patient reports 100% compliance with both forms of birth control. 3 small new pimples this past month on face only. She noticed some dry rash skin on arms yesterday.

## 2019-09-05 ENCOUNTER — APPOINTMENT (OUTPATIENT)
Age: 19
Setting detail: DERMATOLOGY
End: 2019-09-10

## 2019-09-05 DIAGNOSIS — L70.0 ACNE VULGARIS: ICD-10-CM

## 2019-09-05 PROCEDURE — 81025 URINE PREGNANCY TEST: CPT

## 2019-09-05 PROCEDURE — OTHER URINE PREGNANCY TEST: OTHER

## 2019-09-26 ENCOUNTER — APPOINTMENT (OUTPATIENT)
Age: 19
Setting detail: DERMATOLOGY
End: 2019-09-26

## 2019-09-26 VITALS — HEIGHT: 64 IN | WEIGHT: 110 LBS | RESPIRATION RATE: 16 BRPM

## 2019-09-26 DIAGNOSIS — L70.0 ACNE VULGARIS: ICD-10-CM

## 2019-09-26 PROCEDURE — OTHER URINE PREGNANCY TEST: OTHER

## 2019-09-26 PROCEDURE — OTHER PRESCRIPTION: OTHER

## 2019-09-26 PROCEDURE — OTHER ISOTRETINOIN MONITORING: OTHER

## 2019-09-26 PROCEDURE — 81025 URINE PREGNANCY TEST: CPT

## 2019-09-26 PROCEDURE — 99213 OFFICE O/P EST LOW 20 MIN: CPT

## 2019-09-26 RX ORDER — ISOTRETINOIN 30 MG/1
30MG CAPSULE, LIQUID FILLED ORAL TWICE DAILY
Qty: 60 | Refills: 0 | Status: ERX

## 2019-09-26 ASSESSMENT — LOCATION ZONE DERM: LOCATION ZONE: FACE

## 2019-09-26 ASSESSMENT — LOCATION DETAILED DESCRIPTION DERM: LOCATION DETAILED: LEFT INFERIOR FOREHEAD

## 2019-09-26 ASSESSMENT — LOCATION SIMPLE DESCRIPTION DERM: LOCATION SIMPLE: LEFT FOREHEAD

## 2019-09-26 NOTE — HPI: MEDICATION (ISOTRETINOIN)
Is This A New Presentation, Or A Follow-Up?: Follow Up Acne
Additional History: Dryness is tolerable.  Patient reports 100% compliance with abstinence.   5 new small pimples this past month.

## 2019-10-09 ENCOUNTER — RX ONLY (RX ONLY)
Age: 19
End: 2019-10-09

## 2019-10-09 ENCOUNTER — APPOINTMENT (OUTPATIENT)
Age: 19
Setting detail: DERMATOLOGY
End: 2019-10-09

## 2019-10-09 DIAGNOSIS — L70.0 ACNE VULGARIS: ICD-10-CM

## 2019-10-09 PROCEDURE — OTHER ADDITIONAL NOTES: OTHER

## 2019-10-09 PROCEDURE — 81025 URINE PREGNANCY TEST: CPT

## 2019-10-09 PROCEDURE — OTHER URINE PREGNANCY TEST: OTHER

## 2019-10-09 RX ORDER — ISOTRETINOIN 30 MG/1
30MG CAPSULE, LIQUID FILLED ORAL TWICE DAILY
Qty: 60 | Refills: 0 | Status: ERX

## 2019-10-09 NOTE — PROCEDURE: ADDITIONAL NOTES
Additional Notes: Patient missed her window and came in for a CG that was negative. Sent task to  to send in claravis 30mg bid. Confirmed in [facility]. Counseled the patient regarding the absolute necessity of maintaining 100% compliance with her 2 forms of contraception: under arm inplant and male condoms. No sharing of medication.No donating of blood. RTC in 30-37 days.
Detail Level: Simple

## 2019-11-08 ENCOUNTER — APPOINTMENT (OUTPATIENT)
Age: 19
Setting detail: DERMATOLOGY
End: 2019-11-08

## 2019-11-08 VITALS — WEIGHT: 110 LBS | RESPIRATION RATE: 16 BRPM | HEIGHT: 64 IN

## 2019-11-08 DIAGNOSIS — L70.0 ACNE VULGARIS: ICD-10-CM

## 2019-11-08 PROCEDURE — OTHER ISOTRETINOIN MONITORING: OTHER

## 2019-11-08 PROCEDURE — OTHER ADDITIONAL NOTES: OTHER

## 2019-11-08 PROCEDURE — 99214 OFFICE O/P EST MOD 30 MIN: CPT | Mod: 25

## 2019-11-08 PROCEDURE — OTHER PRESCRIPTION: OTHER

## 2019-11-08 PROCEDURE — OTHER VENIPUNCTURE: OTHER

## 2019-11-08 PROCEDURE — 36415 COLL VENOUS BLD VENIPUNCTURE: CPT

## 2019-11-08 RX ORDER — ADAPALENE AND BENZOYL PEROXIDE 1; 25 MG/G; MG/G
GEL TOPICAL
Qty: 1 | Refills: 11 | Status: ERX | COMMUNITY
Start: 2019-11-08

## 2019-11-08 ASSESSMENT — LOCATION DETAILED DESCRIPTION DERM
LOCATION DETAILED: LEFT ANTECUBITAL SKIN
LOCATION DETAILED: LEFT INFERIOR CENTRAL MALAR CHEEK

## 2019-11-08 ASSESSMENT — LOCATION SIMPLE DESCRIPTION DERM
LOCATION SIMPLE: LEFT UPPER ARM
LOCATION SIMPLE: LEFT CHEEK

## 2019-11-08 ASSESSMENT — LOCATION ZONE DERM
LOCATION ZONE: ARM
LOCATION ZONE: FACE

## 2019-11-08 NOTE — HPI: MEDICATION (ISOTRETINOIN)
How Severe Is It?: moderate
Is This A New Presentation, Or A Follow-Up?: Follow Up Isotretinoin
Additional History: Dryness is tolerable. Patient reports 100% compliance with both forms of birth control. Zero new pimples this past month.

## 2019-11-08 NOTE — PROCEDURE: ADDITIONAL NOTES
Additional Notes: Ordered blood work today: lipid panel, and hepatic function sent to [facility]. Pt is to return to the clinic in 30 days for a final UPT.
Detail Level: Simple

## 2019-11-08 NOTE — PROCEDURE: ISOTRETINOIN MONITORING
Kilograms Preamble Statement (Weight Entered In Details Tab): Reported Weight in kilograms:
Hypercholesterolemia Monitoring: I explained this is common when taking isotretinoin. We will monitor closely.
Detail Level: Zone
Myalgia Monitoring: - At this point, patient reports that this is mild and tolerable. Explained this is common when taking isotretinoin. If this worsens, contact us.
Cheilitis Aggressive Treatment: - Recommended application of Dr. Dan’s Cortibalm used several times per day (as often as every hour you are awake when very severe lip dryness is present).\\n- Once improved switch back to Vaseline or Aquaphor numerous times a day and before going to bed.
Cheilitis Normal Treatment: I recommended application of Vaseline or Aquaphor numerous times a day (as often as every hour) and before going to bed.
Target Cumulative Dosage (In Mg/Kg): 135
Add Associated Diagnosis When Managing Medication Side Effects: Yes
Display Individual Monthly Dosage In The Note (If Yes Will Display All Dosages Which Are Not N/A): no
Retinoid Dermatitis Normal Treatment: - Recommended application of Cetaphil or CeraVe cream numerous times a day and before going to bed to all dry and rashy areas.
Headache Monitoring: I recommended monitoring the headaches for now. There is no evidence of increased intracranial pressure. They were instructed to call if the headaches are worsening.
What Is The Patient's Gender: Female
Retinoid Dermatitis Aggressive Treatment: I recommended more frequent application of Cetaphil or CeraVe to the areas of dermatitis. I also prescribed a topical steroid for twice daily use until the dermatitis resolves.
Cheilitis Aggressive Treatment: I recommended application of Vaseline or Aquaphor numerous times a day (as often as every hour) and before going to bed. I also prescribed a topical steroid for twice daily use.
Retinoid Dermatitis Normal Treatment: I recommended more frequent application of Cetaphil or CeraVe to the areas of dermatitis.
Headache Monitoring: - Recommended monitoring the headaches for now. There is no evidence of increased intracranial pressure. They were instructed to call if the headaches are worsening.
Lower Range (In Mg/Kg): 120
Female Completion Statement: FEMALE PATIENT - FINAL VISIT \\n- Discussed that we will be discontinuing isotretinoin today.\\n- Reminded that it is still imperative to maintain 100% compliance with the same methods of birth control she has been using throughout this course of isotretinoin for at least 30 days beyond her final pill. \\n- Advise that a final urine pregnancy test will be necessary to complete the retirements of the [employer] program in 30-37 days from now. \\n- Advised to schedule a nurse visit for this final UPT.   \\n- Reviewed the possible expectations for her acne from here on out.\\n- Discussed maintenance options.\\n- Urine pregnant test performed today.
Is Xerosis Present?: Yes - Normal Treatment
Cheilitis Normal Treatment: - Recommended application of Vaseline ointment or Aquaphor numerous times a day (as often as every hour) and before going to bed.
Pounds Preamble Statement (Weight Entered In Details Tab): Reported Weight in pounds:
Nosebleeds Normal Treatment: I explained this is common when taking isotretinoin. I recommended saline mist in each nostril multiple times a day. If this worsens they will contact us.
Hypertriglyceridemia Treatment: - I explained this is common when taking isotretinoin. \\n- Advised that taking over-the-counter fish oil can be helpful to reduce triglycerides.\\n- We will continue to monitor.
Female Pregnancy Counseling Text: Female patients should also be on two forms of birth control while taking this medication and for one month after their last dose.
Xerosis Aggressive Treatment: - Recommended application of Cetaphil or CeraVe cream numerous times a day and before going to bed to all dry areas.\\n- Also, will prescribe a topical steroid for twice daily use as needed until pruritus is resolved for never longer than 14 days per month.
Months Of Therapy Completed: 8
Use Therapeutic Ranged Or Therapeutic Target: please select Range or Target
Counseling Text: I reviewed the side effect in detail. Patient should get monthly blood tests, not donate blood, not drive at night if vision affected, and not share medication.
Weight Units: pounds
Myalgia Treatment: I explained this is common when taking isotretinoin. If this worsens they will contact us. They may try OTC ibuprofen.
Next Month's Dosage: Continue Current Dosage
Xerosis Aggressive Treatment: I recommended application of Cetaphil or CeraVe numerous times a day going to bed to all dry areas. I also prescribed a topical steroid for twice daily use.
Myalgia Treatment: - Explained this is common when taking isotretinoin. \\n- May try OTC ibuprofen no more frequently than every 6 hours.\\n- If this worsens, then contact us.
Myalgia Monitoring: I explained this is common when taking isotretinoin. If this worsens they will contact us.
Upper Range (In Mg/Kg): 150
Xerosis Normal Treatment: I recommended application of Cetaphil or CeraVe numerous times a day going to bed to all dry areas.
Xerosis Normal Treatment: - Recommended application of Cetaphil or CeraVe cream numerous times a day and before going to bed to all dry areas.
Male Completion Statement: - After discussing his treatment course we decided to discontinue isotretinoin therapy at this time.\\n- He shouldn't donate blood for one month after the last dose. He should call with any new symptoms of depression.\\n- Discussed maintenance options.
Retinoid Dermatitis Aggressive Treatment: - Recommended application of Cetaphil or CeraVe cream numerous times a day and before going to bed to all dry and rashy areas.\\n- Also, will prescribe a topical steroid for twice daily use as needed until pruritus is resolved for never longer than 14 days per month.
Nosebleeds Normal Treatment: - I explained nosebleeds are common when taking isotretinoin. \\n- Recommended saline mist in each nostril multiple times a day. \\n- May also consider applying Aquaphor intranasally once every night.\\n- If this worsens, call the clinic. Patient expressed understanding.

## 2021-03-17 ENCOUNTER — RECORDS - HEALTHEAST (OUTPATIENT)
Dept: LAB | Facility: CLINIC | Age: 21
End: 2021-03-17

## 2021-03-17 LAB
ANION GAP SERPL CALCULATED.3IONS-SCNC: 9 MMOL/L (ref 5–18)
BUN SERPL-MCNC: 10 MG/DL (ref 8–22)
CALCIUM SERPL-MCNC: 9.1 MG/DL (ref 8.5–10.5)
CHLORIDE BLD-SCNC: 104 MMOL/L (ref 98–107)
CO2 SERPL-SCNC: 26 MMOL/L (ref 22–31)
CREAT SERPL-MCNC: 0.75 MG/DL (ref 0.6–1.1)
ERYTHROCYTE [DISTWIDTH] IN BLOOD BY AUTOMATED COUNT: 12.2 % (ref 11–14.5)
FERRITIN SERPL-MCNC: 37 NG/ML (ref 10–130)
GFR SERPL CREATININE-BSD FRML MDRD: >60 ML/MIN/1.73M2
GLUCOSE BLD-MCNC: 88 MG/DL (ref 70–125)
HCT VFR BLD AUTO: 42.4 % (ref 35–47)
HGB BLD-MCNC: 13.6 G/DL (ref 12–16)
MCH RBC QN AUTO: 31.3 PG (ref 27–34)
MCHC RBC AUTO-ENTMCNC: 32.1 G/DL (ref 32–36)
MCV RBC AUTO: 98 FL (ref 80–100)
PLATELET # BLD AUTO: 225 THOU/UL (ref 140–440)
PMV BLD AUTO: 10.2 FL (ref 8.5–12.5)
POTASSIUM BLD-SCNC: 4 MMOL/L (ref 3.5–5)
RBC # BLD AUTO: 4.35 MILL/UL (ref 3.8–5.4)
SODIUM SERPL-SCNC: 139 MMOL/L (ref 136–145)
TSH SERPL DL<=0.005 MIU/L-ACNC: 1.21 UIU/ML (ref 0.3–5)
WBC: 5.5 THOU/UL (ref 4–11)

## 2021-03-18 LAB
C TRACH DNA SPEC QL PROBE+SIG AMP: NEGATIVE
HBV SURFACE AB SERPL IA-ACNC: NEGATIVE M[IU]/ML

## 2021-03-23 LAB
BKR LAB AP ABNORMAL BLEEDING: NO
BKR LAB AP BIRTH CONTROL/HORMONES: ABNORMAL
BKR LAB AP CERVICAL APPEARANCE: NORMAL
BKR LAB AP GYN ADEQUACY: ABNORMAL
BKR LAB AP GYN INTERPRETATION: ABNORMAL
BKR LAB AP HPV REFLEX: ABNORMAL
BKR LAB AP LMP: ABNORMAL
BKR LAB AP PATIENT STATUS: NO
BKR LAB AP PREVIOUS ABNORMAL: ABNORMAL
BKR LAB AP PREVIOUS NORMAL: ABNORMAL
HIGH RISK?: NO
PATH REPORT.COMMENTS IMP SPEC: ABNORMAL
RESULT FLAG (HE HISTORICAL CONVERSION): ABNORMAL

## 2021-04-05 ENCOUNTER — RECORDS - HEALTHEAST (OUTPATIENT)
Dept: LAB | Facility: CLINIC | Age: 21
End: 2021-04-05

## 2021-04-06 LAB — RUBV IGG SERPL QL IA: POSITIVE

## 2021-05-28 ENCOUNTER — RECORDS - HEALTHEAST (OUTPATIENT)
Dept: ADMINISTRATIVE | Facility: CLINIC | Age: 21
End: 2021-05-28

## 2021-05-29 ENCOUNTER — RECORDS - HEALTHEAST (OUTPATIENT)
Dept: ADMINISTRATIVE | Facility: CLINIC | Age: 21
End: 2021-05-29

## 2021-06-02 ENCOUNTER — RECORDS - HEALTHEAST (OUTPATIENT)
Dept: ADMINISTRATIVE | Facility: CLINIC | Age: 21
End: 2021-06-02

## 2022-03-17 ENCOUNTER — LAB REQUISITION (OUTPATIENT)
Dept: LAB | Facility: CLINIC | Age: 22
End: 2022-03-17

## 2022-03-17 DIAGNOSIS — R87.612 LOW GRADE SQUAMOUS INTRAEPITHELIAL LESION ON CYTOLOGIC SMEAR OF CERVIX (LGSIL): ICD-10-CM

## 2022-03-17 DIAGNOSIS — Z11.8 ENCOUNTER FOR SCREENING FOR OTHER INFECTIOUS AND PARASITIC DISEASES: ICD-10-CM

## 2022-03-17 PROCEDURE — 88141 CYTOPATH C/V INTERPRET: CPT | Performed by: PATHOLOGY

## 2022-03-17 PROCEDURE — 88142 CYTOPATH C/V THIN LAYER: CPT | Performed by: PHYSICIAN ASSISTANT

## 2022-03-17 PROCEDURE — 87491 CHLMYD TRACH DNA AMP PROBE: CPT | Performed by: PHYSICIAN ASSISTANT

## 2022-03-17 PROCEDURE — 87624 HPV HI-RISK TYP POOLED RSLT: CPT | Performed by: PHYSICIAN ASSISTANT

## 2022-03-18 LAB — C TRACH DNA SPEC QL NAA+PROBE: NEGATIVE

## 2022-03-21 LAB
HUMAN PAPILLOMA VIRUS 16 DNA: NEGATIVE
HUMAN PAPILLOMA VIRUS 18 DNA: NEGATIVE
HUMAN PAPILLOMA VIRUS FINAL DIAGNOSIS: ABNORMAL
HUMAN PAPILLOMA VIRUS OTHER HR: POSITIVE

## 2022-03-23 LAB
BKR LAB AP GYN ADEQUACY: ABNORMAL
BKR LAB AP GYN INTERPRETATION: ABNORMAL
BKR LAB AP HPV REFLEX: ABNORMAL
BKR LAB AP LMP: ABNORMAL
BKR LAB AP PREVIOUS ABNL DX: ABNORMAL
BKR LAB AP PREVIOUS ABNORMAL: ABNORMAL
PATH REPORT.COMMENTS IMP SPEC: ABNORMAL
PATH REPORT.COMMENTS IMP SPEC: ABNORMAL
PATH REPORT.RELEVANT HX SPEC: ABNORMAL

## 2024-02-28 ENCOUNTER — LAB REQUISITION (OUTPATIENT)
Dept: LAB | Facility: CLINIC | Age: 24
End: 2024-02-28

## 2024-02-28 DIAGNOSIS — Z12.4 ENCOUNTER FOR SCREENING FOR MALIGNANT NEOPLASM OF CERVIX: ICD-10-CM

## 2024-02-28 PROCEDURE — G0145 SCR C/V CYTO,THINLAYER,RESCR: HCPCS | Performed by: PHYSICIAN ASSISTANT

## 2024-03-03 LAB
BKR LAB AP GYN ADEQUACY: NORMAL
BKR LAB AP GYN INTERPRETATION: NORMAL
BKR LAB AP HPV REFLEX: NORMAL
BKR LAB AP LMP: NORMAL
BKR LAB AP PREVIOUS ABNL DX: NORMAL
BKR LAB AP PREVIOUS ABNORMAL: NORMAL
PATH REPORT.COMMENTS IMP SPEC: NORMAL
PATH REPORT.COMMENTS IMP SPEC: NORMAL
PATH REPORT.RELEVANT HX SPEC: NORMAL